# Patient Record
Sex: MALE | Race: WHITE
[De-identification: names, ages, dates, MRNs, and addresses within clinical notes are randomized per-mention and may not be internally consistent; named-entity substitution may affect disease eponyms.]

---

## 2017-02-14 ENCOUNTER — HOSPITAL ENCOUNTER (OUTPATIENT)
Dept: HOSPITAL 45 - C.LABPBG | Age: 57
Discharge: HOME | End: 2017-02-14
Attending: INTERNAL MEDICINE
Payer: COMMERCIAL

## 2017-02-14 DIAGNOSIS — E11.29: ICD-10-CM

## 2017-02-14 DIAGNOSIS — E78.5: ICD-10-CM

## 2017-02-14 DIAGNOSIS — I10: Primary | ICD-10-CM

## 2017-02-14 DIAGNOSIS — D64.9: ICD-10-CM

## 2017-02-14 DIAGNOSIS — E11.65: ICD-10-CM

## 2017-02-14 LAB
ALBUMIN/GLOB SERPL: 1 {RATIO} (ref 0.9–2)
ALP SERPL-CCNC: 101 U/L (ref 45–117)
ALT SERPL-CCNC: 27 U/L (ref 12–78)
ANION GAP SERPL CALC-SCNC: 12 MMOL/L (ref 3–11)
AST SERPL-CCNC: 19 U/L (ref 15–37)
BASOPHILS # BLD: 0.03 K/UL (ref 0–0.2)
BASOPHILS NFR BLD: 0.4 %
BUN SERPL-MCNC: 35 MG/DL (ref 7–18)
BUN/CREAT SERPL: 22.1 (ref 10–20)
CALCIUM SERPL-MCNC: 9.2 MG/DL (ref 8.5–10.1)
CHLORIDE SERPL-SCNC: 100 MMOL/L (ref 98–107)
CHOLEST/HDLC SERPL: 2.8 {RATIO}
CO2 SERPL-SCNC: 26 MMOL/L (ref 21–32)
COMPLETE: YES
CREAT SERPL-MCNC: 1.6 MG/DL (ref 0.6–1.4)
EOSINOPHIL NFR BLD AUTO: 180 K/UL (ref 130–400)
GLOBULIN SER-MCNC: 3.9 GM/DL (ref 2.5–4)
GLUCOSE SERPL-MCNC: 163 MG/DL (ref 70–99)
GLUCOSE UR QL: 54 MG/DL
HCT VFR BLD CALC: 40.7 % (ref 42–52)
IG%: 0.2 %
IMM GRANULOCYTES NFR BLD AUTO: 14.6 %
KETONES UR QL STRIP: 71 MG/DL
LYMPHOCYTES # BLD: 1.24 K/UL (ref 1.2–3.4)
MCH RBC QN AUTO: 30.5 PG (ref 25–34)
MCHC RBC AUTO-ENTMCNC: 33.9 G/DL (ref 32–36)
MCV RBC AUTO: 90 FL (ref 80–100)
MONOCYTES NFR BLD: 7.3 %
NEUTROPHILS # BLD AUTO: 8.8 %
NEUTROPHILS NFR BLD AUTO: 68.7 %
NITRITE UR QL STRIP: 130 MG/DL (ref 0–150)
PH UR: 151 MG/DL (ref 0–200)
PMV BLD AUTO: 10.2 FL (ref 7.4–10.4)
POTASSIUM SERPL-SCNC: 4 MMOL/L (ref 3.5–5.1)
RBC # BLD AUTO: 4.52 M/UL (ref 4.7–6.1)
SODIUM SERPL-SCNC: 138 MMOL/L (ref 136–145)
TSH SERPL-ACNC: 1.51 UIU/ML (ref 0.3–4.5)
VERY LOW DENSITY LIPOPROT CALC: 26 MG/DL
WBC # BLD AUTO: 8.51 K/UL (ref 4.8–10.8)

## 2017-02-15 LAB — EST. AVERAGE GLUCOSE BLD GHB EST-MCNC: 194 MG/DL

## 2017-06-26 ENCOUNTER — HOSPITAL ENCOUNTER (OUTPATIENT)
Dept: HOSPITAL 45 - C.LABPBG | Age: 57
Discharge: HOME | End: 2017-06-26
Attending: INTERNAL MEDICINE
Payer: COMMERCIAL

## 2017-06-26 DIAGNOSIS — Z79.4: ICD-10-CM

## 2017-06-26 DIAGNOSIS — I10: Primary | ICD-10-CM

## 2017-06-26 DIAGNOSIS — N18.3: ICD-10-CM

## 2017-06-26 DIAGNOSIS — D64.9: ICD-10-CM

## 2017-06-26 DIAGNOSIS — E11.65: ICD-10-CM

## 2017-06-26 DIAGNOSIS — E87.1: ICD-10-CM

## 2017-06-26 LAB
ANION GAP SERPL CALC-SCNC: 8 MMOL/L (ref 3–11)
APPEARANCE UR: CLEAR
BILIRUB UR-MCNC: (no result) MG/DL
BUN SERPL-MCNC: 31 MG/DL (ref 7–18)
BUN/CREAT SERPL: 19.5 (ref 10–20)
CALCIUM SERPL-MCNC: 9.3 MG/DL (ref 8.5–10.1)
CHLORIDE SERPL-SCNC: 103 MMOL/L (ref 98–107)
CO2 SERPL-SCNC: 28 MMOL/L (ref 21–32)
COLOR UR: YELLOW
CREAT SERPL-MCNC: 1.6 MG/DL (ref 0.6–1.4)
CREAT UR-MCNC: 150 MG/DL
EOSINOPHIL NFR BLD AUTO: 185 K/UL (ref 130–400)
EST. AVERAGE GLUCOSE BLD GHB EST-MCNC: 140 MG/DL
GLUCOSE SERPL-MCNC: 114 MG/DL (ref 70–99)
HCT VFR BLD CALC: 41 % (ref 42–52)
MANUAL MICROSCOPIC REQUIRED?: NO
MCH RBC QN AUTO: 30.5 PG (ref 25–34)
MCHC RBC AUTO-ENTMCNC: 32.9 G/DL (ref 32–36)
MCV RBC AUTO: 92.6 FL (ref 80–100)
NITRITE UR QL STRIP: (no result)
PH UR STRIP: 5 [PH] (ref 4.5–7.5)
PHOSPHATE SERPL-MCNC: 3.4 MG/DL (ref 2.5–4.9)
PMV BLD AUTO: 10 FL (ref 7.4–10.4)
POTASSIUM SERPL-SCNC: 4.1 MMOL/L (ref 3.5–5.1)
PROT UR STRIP-MCNC: 55.3 MG/DL (ref 0–11.9)
RBC # BLD AUTO: 4.43 M/UL (ref 4.7–6.1)
REVIEW REQ?: NO
SODIUM SERPL-SCNC: 139 MMOL/L (ref 136–145)
SP GR UR STRIP: 1.02 (ref 1–1.03)
URINE BILL WITH OR WITHOUT MIC: (no result)
URINE EPITHELIAL CELL AUTO: (no result) /LPF (ref 0–5)
URINE PROTIEN/CREAT RATIO: 0.4 (ref 0–0.2)
UROBILINOGEN UR-MCNC: (no result) MG/DL
WBC # BLD AUTO: 8.91 K/UL (ref 4.8–10.8)

## 2017-07-17 ENCOUNTER — HOSPITAL ENCOUNTER (OUTPATIENT)
Dept: HOSPITAL 45 - C.NEUR | Age: 57
Discharge: HOME | End: 2017-07-17
Attending: INTERNAL MEDICINE
Payer: COMMERCIAL

## 2017-07-17 DIAGNOSIS — R40.0: Primary | ICD-10-CM

## 2017-07-17 DIAGNOSIS — G47.30: ICD-10-CM

## 2017-07-19 NOTE — SLEEP STUDY
Sleep Study Report


Date of Service:


July 17, 2017


Sleep Study Report


Clinical data:  The patient is a 56-year-old male with BMI 36.41 referred for 

evaluation of sleep apnea.  The patient has snoring, fatigue, hypertension, and 

hypersomnolence.  On evening of July 17, 2017, a home sleep apnea test was 

performed using a B5M.COM type 3 monitor.





Recording results:  Total recording time was 10 hours.  Patient monitoring time 

and estimated sleep time was 10 hours.





Respiratory data:  Mild sleep apnea was documented.  The CLAUDIA was 14.4.  There 

were 41 obstructive, 20 mixed, and 16 central apneic episodes.  There were 67 

hypopneas episodes recorded.  The longest respiratory event was 52 seconds.





Oximetry data:  Nocturnal hypoxemia was seen.  Oxygen [place] was 73 percent.  

Mean saturation was 93 percent.  Time below 89 percent was 24 minutes.





Heart rate data:  Heart rates ranged from 45 to 87 beats per minute.





Snoring data:  Snoring was recorded throughout the entire night.





Impression:  56-year-old male with mild sleep apnea and nocturnal hypoxemia.





Recommendations: The patient may benefit from use of an oral appliance, use of 

auto CPAP, or a repeat sleep study with CPAP.  Sleep medicine consultation may 

be of benefit.  Clinical correlation is needed.


Copies To 1:   Derek Larsen M.D.

## 2017-11-21 ENCOUNTER — HOSPITAL ENCOUNTER (OUTPATIENT)
Dept: HOSPITAL 45 - C.LAB1850 | Age: 57
Discharge: HOME | End: 2017-11-21
Attending: NURSE PRACTITIONER
Payer: COMMERCIAL

## 2017-11-21 DIAGNOSIS — D64.9: ICD-10-CM

## 2017-11-21 DIAGNOSIS — Z86.39: ICD-10-CM

## 2017-11-21 DIAGNOSIS — E78.5: ICD-10-CM

## 2017-11-21 DIAGNOSIS — E11.29: ICD-10-CM

## 2017-11-21 DIAGNOSIS — I10: Primary | ICD-10-CM

## 2017-11-21 DIAGNOSIS — R80.9: ICD-10-CM

## 2017-11-21 LAB
ANION GAP SERPL CALC-SCNC: 6 MMOL/L (ref 3–11)
BASOPHILS # BLD: 0.03 K/UL (ref 0–0.2)
BASOPHILS NFR BLD: 0.4 %
BUN SERPL-MCNC: 32 MG/DL (ref 7–18)
BUN/CREAT SERPL: 18.5 (ref 10–20)
CALCIUM SERPL-MCNC: 8.7 MG/DL (ref 8.5–10.1)
CHLORIDE SERPL-SCNC: 100 MMOL/L (ref 98–107)
CO2 SERPL-SCNC: 30 MMOL/L (ref 21–32)
COMPLETE: YES
CREAT SERPL-MCNC: 1.71 MG/DL (ref 0.6–1.4)
EOSINOPHIL NFR BLD AUTO: 204 K/UL (ref 130–400)
GLUCOSE SERPL-MCNC: 143 MG/DL (ref 70–99)
HCT VFR BLD CALC: 37.4 % (ref 42–52)
IG%: 0.2 %
IMM GRANULOCYTES NFR BLD AUTO: 14.8 %
LYMPHOCYTES # BLD: 1.24 K/UL (ref 1.2–3.4)
MCH RBC QN AUTO: 30.2 PG (ref 25–34)
MCHC RBC AUTO-ENTMCNC: 33.4 G/DL (ref 32–36)
MCV RBC AUTO: 90.3 FL (ref 80–100)
MONOCYTES NFR BLD: 7.9 %
NEUTROPHILS # BLD AUTO: 5.7 %
NEUTROPHILS NFR BLD AUTO: 71 %
PMV BLD AUTO: 9.6 FL (ref 7.4–10.4)
POTASSIUM SERPL-SCNC: 3.9 MMOL/L (ref 3.5–5.1)
PSA SERPL-MCNC: 0.78 NG/ML (ref 0–4)
RBC # BLD AUTO: 4.14 M/UL (ref 4.7–6.1)
SODIUM SERPL-SCNC: 136 MMOL/L (ref 136–145)
WBC # BLD AUTO: 8.38 K/UL (ref 4.8–10.8)

## 2017-11-22 LAB — EST. AVERAGE GLUCOSE BLD GHB EST-MCNC: 163 MG/DL

## 2018-01-12 ENCOUNTER — HOSPITAL ENCOUNTER (OUTPATIENT)
Dept: HOSPITAL 45 - C.CTS | Age: 58
Discharge: HOME | End: 2018-01-12
Attending: FAMILY MEDICINE
Payer: COMMERCIAL

## 2018-01-12 DIAGNOSIS — I51.7: Primary | ICD-10-CM

## 2018-01-12 DIAGNOSIS — E04.1: ICD-10-CM

## 2018-01-12 NOTE — DIAGNOSTIC IMAGING REPORT
CT SCAN OF THE CHEST WITHOUT IV CONTRAST



CLINICAL HISTORY: Follow-up unspecified abnormal chest x-ray.



COMPARISON STUDY:  Chest x-ray dated 10/24/2016.



TECHNIQUE:  CT scan of the thorax was performed from the thoracic inlet to the

upper abdomen. Images are reviewed in the axial, sagittal, and coronal planes.

IV contrast was not administered for this examination due to elevated serum

creatinine.  A dose lowering technique was utilized adhering to the principles

of ALARA.



CT DOSE: 596.03 mGycm



FINDINGS:



Thyroid: Imaged portions of the thyroid gland are normal in size and

attenuation. A 12 mm low-attenuation nodule is seen in the left lobe.



Thoracic aorta: There is atherosclerotic calcification of the thoracic aorta,

which is normal in caliber and demonstrates standard 3-vessel arch anatomy.



Heart: The heart is mildly enlarged and there is trace pericardial effusion. The

coronary arteries are densely calcified. The pulmonary trunk is normal in

caliber.



Lungs and pleural spaces: The trachea and central airways are clear. No airspace

consolidation or pleural effusion is identified. Foci of linear atelectasis

versus scarring are present at both lung bases. No concerning pulmonary lesion

is seen.



Mediastinum: There is no mediastinal lymphadenopathy.



Lorenza: Grossly clear but not well assessed without IV contrast.



Axillae: There is no axillary lymphadenopathy.



Upper abdomen: A tiny hiatal hernia is identified. Cholecystectomy clips are

noted.



Skeletal structures: No lytic or blastic bony lesions are seen. A left cervical

rib is noted. There are healed left lower rib fractures.





IMPRESSION:



1. There is no airspace consolidation or pleural effusion.



2. No concerning pulmonary lesion is identified. Correlation with the reportedly

abnormal chest x-ray will be required.



3. Mild cardiac enlargement.



4. A 12 mm low-attenuation nodule is identified in the left thyroid lobe.

Follow-up with a nonemergent thyroid ultrasound is recommended for further

assessment.



5. Additional findings as above.







Electronically signed by:  Quinn Rhodes M.D.

1/12/2018 9:23 AM



Dictated Date/Time:  1/12/2018 9:18 AM

## 2018-01-23 ENCOUNTER — HOSPITAL ENCOUNTER (OUTPATIENT)
Dept: HOSPITAL 45 - C.LABPBG | Age: 58
Discharge: HOME | End: 2018-01-23
Attending: INTERNAL MEDICINE
Payer: COMMERCIAL

## 2018-01-23 DIAGNOSIS — R80.9: Primary | ICD-10-CM

## 2018-01-23 DIAGNOSIS — N18.3: ICD-10-CM

## 2018-01-23 LAB
ALBUMIN SERPL-MCNC: 3.7 GM/DL (ref 3.4–5)
BUN SERPL-MCNC: 29 MG/DL (ref 7–18)
CALCIUM SERPL-MCNC: 9.1 MG/DL (ref 8.5–10.1)
CO2 SERPL-SCNC: 30 MMOL/L (ref 21–32)
CREAT SERPL-MCNC: 1.71 MG/DL (ref 0.6–1.4)
EOSINOPHIL NFR BLD AUTO: 204 K/UL (ref 130–400)
GLUCOSE SERPL-MCNC: 97 MG/DL (ref 70–99)
HCT VFR BLD CALC: 38.5 % (ref 42–52)
HGB BLD-MCNC: 12.6 G/DL (ref 14–18)
MCH RBC QN AUTO: 29.9 PG (ref 25–34)
MCHC RBC AUTO-ENTMCNC: 32.7 G/DL (ref 32–36)
MCV RBC AUTO: 91.4 FL (ref 80–100)
PHOSPHATE SERPL-MCNC: 2.8 MG/DL (ref 2.5–4.9)
PMV BLD AUTO: 9.9 FL (ref 7.4–10.4)
POTASSIUM SERPL-SCNC: 3.9 MMOL/L (ref 3.5–5.1)
RED CELL DISTRIBUTION WIDTH CV: 13 % (ref 11.5–14.5)
RED CELL DISTRIBUTION WIDTH SD: 43.1 FL (ref 36.4–46.3)
SODIUM SERPL-SCNC: 135 MMOL/L (ref 136–145)
WBC # BLD AUTO: 7.9 K/UL (ref 4.8–10.8)

## 2018-05-15 ENCOUNTER — HOSPITAL ENCOUNTER (OUTPATIENT)
Dept: HOSPITAL 45 - C.NEUR | Age: 58
Discharge: HOME | End: 2018-05-15
Attending: INTERNAL MEDICINE
Payer: COMMERCIAL

## 2018-05-15 VITALS
WEIGHT: 245.15 LBS | BODY MASS INDEX: 36.73 KG/M2 | WEIGHT: 245.15 LBS | HEIGHT: 68.5 IN | BODY MASS INDEX: 36.73 KG/M2 | HEIGHT: 68.5 IN

## 2018-05-15 VITALS — SYSTOLIC BLOOD PRESSURE: 165 MMHG | DIASTOLIC BLOOD PRESSURE: 85 MMHG | HEART RATE: 94 BPM

## 2018-05-15 DIAGNOSIS — E11.29: ICD-10-CM

## 2018-05-15 DIAGNOSIS — Z79.4: ICD-10-CM

## 2018-05-15 DIAGNOSIS — G47.30: Primary | ICD-10-CM

## 2018-05-15 DIAGNOSIS — R40.0: ICD-10-CM

## 2018-05-15 DIAGNOSIS — E11.49: ICD-10-CM

## 2018-05-15 DIAGNOSIS — G47.34: ICD-10-CM

## 2018-08-09 ENCOUNTER — HOSPITAL ENCOUNTER (OUTPATIENT)
Dept: HOSPITAL 45 - C.LABPBG | Age: 58
Discharge: HOME | End: 2018-08-09
Attending: NURSE PRACTITIONER
Payer: COMMERCIAL

## 2018-08-09 DIAGNOSIS — Z79.4: ICD-10-CM

## 2018-08-09 DIAGNOSIS — E11.22: ICD-10-CM

## 2018-08-09 DIAGNOSIS — I12.9: ICD-10-CM

## 2018-08-09 DIAGNOSIS — E11.29: ICD-10-CM

## 2018-08-09 DIAGNOSIS — E78.2: ICD-10-CM

## 2018-08-09 DIAGNOSIS — N18.3: ICD-10-CM

## 2018-08-09 DIAGNOSIS — Z00.00: Primary | ICD-10-CM

## 2018-08-09 LAB
ALBUMIN SERPL-MCNC: 3.6 GM/DL (ref 3.4–5)
BUN SERPL-MCNC: 30 MG/DL (ref 7–18)
CALCIUM SERPL-MCNC: 8.5 MG/DL (ref 8.5–10.1)
CO2 SERPL-SCNC: 27 MMOL/L (ref 21–32)
CREAT SERPL-MCNC: 1.71 MG/DL (ref 0.6–1.4)
GLUCOSE SERPL-MCNC: 193 MG/DL (ref 70–99)
KETONES UR QL STRIP: 42 MG/DL
PH UR: 130 MG/DL (ref 0–200)
PHOSPHATE SERPL-MCNC: 3.8 MG/DL (ref 2.5–4.9)
POTASSIUM SERPL-SCNC: 3.7 MMOL/L (ref 3.5–5.1)
SODIUM SERPL-SCNC: 132 MMOL/L (ref 136–145)

## 2018-08-10 LAB — HBA1C MFR BLD: 7.8 % (ref 4.5–5.6)
